# Patient Record
Sex: MALE
[De-identification: names, ages, dates, MRNs, and addresses within clinical notes are randomized per-mention and may not be internally consistent; named-entity substitution may affect disease eponyms.]

---

## 2020-01-30 ENCOUNTER — NURSE TRIAGE (OUTPATIENT)
Dept: OTHER | Facility: CLINIC | Age: 30
End: 2020-01-30

## 2020-12-20 ENCOUNTER — NURSE TRIAGE (OUTPATIENT)
Dept: OTHER | Facility: CLINIC | Age: 30
End: 2020-12-20

## 2020-12-20 NOTE — TELEPHONE ENCOUNTER
Patient called pre-service center [facility])  MMO with red flag complaint. Brief description of triage: His dad on Friday mentioned he could have covid symptoms and feeling off on wed but could be minor he called the MD office they advised they would call back if he should get a covid test and they are waiting on that he is a  and was going to go to work on Monday he wants to know where he should go to get tested prior to Monday and wanted advise     Triage indicates for patient to maintain home care or go to urgent care if needed    Care advice provided, patient verbalizes understanding; denies any other questions or concerns; instructed to call back for any new or worsening symptoms. Attention Provider: Thank you for allowing me to participate in the care of your patient. The patient was connected to triage in response to information provided to the ECC. Please do not respond through this encounter as the response is not directed to a shared pool. Reason for Disposition   [1] CLOSE CONTACT COVID-19 EXPOSURE within last 14 days AND [2] NO symptoms    Answer Assessment - Initial Assessment Questions  1. COVID-19 CLOSE CONTACT: \"Who is the person with the confirmed or suspected COVID-19 infection that you were exposed to? \"      Na    2. PLACE of CONTACT: \"Where were you when you were exposed to COVID-19? \" (e.g., home, school, medical waiting room; which city?)      Home     3. TYPE of CONTACT: \"How much contact was there? \" (e.g., sitting next to, live in same house, work in same office, same building)      Dad is symptomatic     4. DURATION of CONTACT: \"How long were you in contact with the COVID-19 patient? \" (e.g., a few seconds, passed by person, a few minutes, 15 minutes or longer, live with the patient)      Daily     5. MASK: \"Were you wearing a mask? \" \"Was the other person wearing a mask? \" Note: wearing a mask reduces the risk of an   otherwise close contact. No     6.  DATE of CONTACT: \"When did you have contact with a COVID-19 patient? \" (e.g., how many days ago)      Live with him so daily     7. COMMUNITY SPREAD: \"Are there lots of cases of COVID-19 (community spread) where you live? \" (See public health department website, if unsure)        Yes    8. SYMPTOMS: \"Do you have any symptoms? \" (e.g., fever, cough, breathing difficulty, loss of taste or smell)      No symptoms     9. PREGNANCY OR POSTPARTUM: \"Is there any chance you are pregnant? \" \"When was your last menstrual period? \" \"Did you deliver in the last 2 weeks? \"      Na    10. HIGH RISK: \"Do you have any heart or lung problems? Do you have a weak immune system? \" (e.g., heart failure, COPD, asthma, HIV positive, chemotherapy, renal failure, diabetes mellitus, sickle cell anemia, obesity)        No    11. TRAVEL: \"Have you traveled out of the country recently? \" If so, \"When and where? \"  Also ask about out-of-state travel, since the [facility] has identified some high-risk cities for community spread in the [address] [place] [address] Rd[address]. Note: Travel becomes less relevant if there is widespread community transmission where the patient lives.         na    Protocols used: CORONAVIRUS (COVID-19) EXPOSURE-ADULT-AH

## 2021-05-16 ENCOUNTER — NURSE TRIAGE (OUTPATIENT)
Dept: OTHER | Facility: CLINIC | Age: 31
End: 2021-05-16

## 2021-05-16 NOTE — TELEPHONE ENCOUNTER
Reason for Disposition   COVID-19 Home Isolation, questions about    Answer Assessment - Initial Assessment Questions  1. COVID-19 DIAGNOSIS: \"Who made your Coronavirus (COVID-19) diagnosis? \" \"Was it confirmed by a positive lab test?\" If not diagnosed by a HCP, ask \"Are there lots of cases (community spread) where you live? \" (See public health department website, if unsure)      Tested within past 2 weeks ago and negative    2. COVID-19 EXPOSURE: \"Was there any known exposure to COVID before the symptoms began? \" CDC Definition of close contact: within 6 feet (2 meters) for a total of 15 minutes or more over a 24-hour period. Sister also tested negative    3. ONSET: \"When did the COVID-19 symptoms start? \"       2 weeks ago    4. WORST SYMPTOM: \"What is your worst symptom? \" (e.g., cough, fever, shortness of breath, muscle aches)      Stomach ache that has resolved    5. COUGH: \"Do you have a cough? \" If so, ask: \"How bad is the cough? \"        None    6. FEVER: \"Do you have a fever? \" If so, ask: \"What is your temperature, how was it measured, and when did it start? \"      None    7. RESPIRATORY STATUS: \"Describe your breathing? \" (e.g., shortness of breath, wheezing, unable to speak)       NA    8. BETTER-SAME-WORSE: Emeka Ramirez you getting better, staying the same or getting worse compared to yesterday? \"  If getting worse, ask, \"In what way? \"      Better    9. HIGH RISK DISEASE: \"Do you have any chronic medical problems? \" (e.g., asthma, heart or lung disease, weak immune system, obesity, etc.)      NA    10. PREGNANCY: \"Is there any chance you are pregnant? \" \"When was your last menstrual period? \"        NA    11. OTHER SYMPTOMS: \"Do you have any other symptoms? \"  (e.g., chills, fatigue, headache, loss of smell or taste, muscle pain, sore throat; new loss of smell or taste especially support the diagnosis of COVID-19)        None    Protocols used: CORONAVIRUS (COVID-19) DIAGNOSED OR SUSPECTED-ADULT-AH

## 2021-12-12 ENCOUNTER — NURSE TRIAGE (OUTPATIENT)
Dept: OTHER | Facility: CLINIC | Age: 31
End: 2021-12-12

## 2021-12-12 NOTE — TELEPHONE ENCOUNTER
Brief description of triage: Finished Covid quarantine, and wants to know, is it common to get upset stomachs after recovering from Covid. He noticed that his coffee made his stomach upset  The first day, but now it is back to normal.  He noticed it again after eating spicy wings. He wanted to know could he be reinfected within 90 days of recovering from Covid. Triage indicates for patient to [address] advice provided, patient verbalizes understanding; denies any other questions or concerns; instructed to call back for any new or worsening symptoms.    -This triage is a result of a call to [address] [place] [address]. Please do not respond to the triage nurse through this encounter. Any subsequent communication should be directly with the patient. Reason for Disposition   General information question, no triage required and triager able to answer question    Answer Assessment - Initial Assessment Questions  1. REASON FOR CALL or QUESTION: \"What is your reason for calling today? \" or \"How can I best help you? \" or \"What question do you have that I can help answer? \"       He wanted to know could he be reinfected within 90 days of recovering from Covid? Is it normal for your stomach to get upset when staring old routines like drinking coffee in the morning or eating spicy or oily foods for the first time after Covid recovery. Staff informed him that this is normal since his diet may have changed drastically when he was sick with the virus.     Protocols used: INFORMATION ONLY CALL - NO TRIAGE-ADULT-

## 2022-01-16 ENCOUNTER — NURSE TRIAGE (OUTPATIENT)
Dept: OTHER | Facility: CLINIC | Age: 32
End: 2022-01-16

## 2022-01-16 NOTE — TELEPHONE ENCOUNTER
Subjective: Caller states \"I'm still having headaches since having COVID 2 months ago and only when I drink a little extra coffee. I'm worried I might have COVID again. \"     Current Symptoms: See above. \"discomfort\" above and behind ears, R>L, only after coffee. Lasts about 8 hours and resolves spontaneously. Pt also had many questions re: antibody levels after infection, possibility of re-infection (not vaccinated-- this writer recommended vaccine) and post-covid conditions. Read to pt from cdc.gov website re:garding previous topics, questions answered. Onset: 2 months ago; gradual    Associated Symptoms: NA    Pain Severity: 1/10 today, 3/10 max; pressure; intermittent    Temperature: denies fever. What has been tried: States that discomfort hasn't been bad enough to take meds    LMP: NA Pregnant: No    Recommended disposition: see PCP within 2 weeks. Pt agrees. Care advice provided, patient verbalizes understanding; denies any other questions or concerns; instructed to call back for any new or worsening symptoms. Patient/caller to follow-up with PCP     This triage is a result of a call to [address] [place] [address]. Please do not respond to the triage nurse through this encounter. Any subsequent communication should be directly with the patient.         Reason for Disposition   Headache is a chronic symptom (recurrent or ongoing AND present > 4 weeks)    Protocols used: HEADACHE-ADULT-

## 2022-02-25 ENCOUNTER — NURSE TRIAGE (OUTPATIENT)
Dept: OTHER | Facility: CLINIC | Age: 32
End: 2022-02-25

## 2022-02-26 NOTE — TELEPHONE ENCOUNTER
Subjective: Caller states \"I had slight headache earlier today. It went away and I am worried about COVID. \"      Current Symptoms: pt is asymptomatic     Onset: 1 days ago; sudden    Associated Symptoms: NA    Pain Severity: Denies     Temperature:Denies     What has been tried: N/A     LMP: NA Pregnant: NA    Recommended disposition: Home Care    Care advice provided, patient verbalizes understanding; denies any other questions or concerns; instructed to call back for any new or worsening symptoms. Home Care- Questions about COVID only     This triage is a result of a call to [facility]. Please do not respond to the triage nurse through this encounter. Any subsequent communication should be directly with the patient.       Reason for Disposition   COVID-19 Prevention and Healthy Living, questions about    Protocols used: CORONAVIRUS (COVID-19) EXPOSURE-ADULT-AH

## 2022-03-16 ENCOUNTER — NURSE TRIAGE (OUTPATIENT)
Dept: OTHER | Facility: CLINIC | Age: 32
End: 2022-03-16

## 2022-03-16 NOTE — TELEPHONE ENCOUNTER
Subjective: Caller states \"recent abd pain after abx use\"     Current Symptoms:   Mild abd pain - sharp sort of cramping     Onset:  3 days     Associated Symptoms: clindamycin recently     Pain Severity: mild not all the time    Temperature: Denies     What has been tried: none    LMP: NA Pregnant: NA    Recommended disposition: Kathia Adrian [phone] advice provided, patient verbalizes understanding; denies any other questions or concerns; instructed to call back for any new or worsening symptoms. home care with care advice    This triage is a result of a call to [address] [place] [address]. Please do not respond to the triage nurse through this encounter. Any subsequent communication should be directly with the patient.       Reason for Disposition   [1] MILD-MODERATE pain AND [2] comes and goes (cramps)    Protocols used: ABDOMINAL PAIN - MALE-ADULT-

## 2022-03-20 ENCOUNTER — NURSE TRIAGE (OUTPATIENT)
Dept: OTHER | Facility: CLINIC | Age: 32
End: 2022-03-20

## 2022-03-20 NOTE — TELEPHONE ENCOUNTER
Subjective: Caller states \"exposure\"     Current Symptoms:   Has anxiety, trying to \"not be paranoid\"  Had Covid in October  Met with a friend today with lunch who had Covid in December  Friend was getting over a head cold\" that had started a week and a half ago  Congestion and hoarse sounding  Patient wondering if he should consider this an exposure to Covid and quarantine    Advised patient per CDC. Reinfection risk low within 6 months of having Covid  Unlikely that his friend had Covid, friend was likely at the end of his isolation that he would have had to complete if it was Covid  Advised patient that writer would not consider this a Covid exposure  Informed he could test in 5 days if he wanted to be sure    Denies any other questions or concerns; instructed to call back for any new or worsening symptoms. This triage is a result of a call to [address] [place] [address]. Please do not respond to the triage nurse through this encounter. Any subsequent communication should be directly with the patient.     Reason for Disposition   Health Information question, no triage required and triager able to answer question    Protocols used: INFORMATION ONLY CALL - NO TRIAGE-ADULT-

## 2022-04-25 ENCOUNTER — NURSE TRIAGE (OUTPATIENT)
Dept: OTHER | Facility: CLINIC | Age: 32
End: 2022-04-25

## 2022-04-26 NOTE — TELEPHONE ENCOUNTER
Caller with general question about having a runny nose but negative covid test. Says he is a very anxious person, babbled a lot but in the end caller just wanted to know if it could be likely his runny nose was not covid since he had no exposure and test was negative. Advised likely not covid but if he is unsure and develops other symptoms, retest as he didn't want to quarantine unless medically necessary, which it's not.   Reason for Disposition   General information question, no triage required and triager able to answer question    Protocols used: INFORMATION ONLY CALL - NO TRIAGE-ADULT-

## 2022-09-02 ENCOUNTER — NURSE TRIAGE (OUTPATIENT)
Dept: OTHER | Facility: CLINIC | Age: 32
End: 2022-09-02

## 2022-09-02 NOTE — TELEPHONE ENCOUNTER
Subjective: Caller states \"covid questions\"     Current Symptoms:   + covid, questions regarding quarantine     Onset:   11 days ago    Pain Severity:   N/a     Temperature:    N/a    What has been tried:   N/a    Recommended disposition: Home care     Care advice provided, patient verbalizes understanding; denies any other questions or concerns; instructed to call back for any new or worsening symptoms. This triage is a result of a call to [address] [place] [address]. Please do not respond to the triage nurse through this encounter. Any subsequent communication should be directly with the patient.     Reason for Disposition   COVID-19 Home Isolation, questions about    Protocols used: Coronavirus (COVID-19) Diagnosed or Suspected-ADULT-

## 2022-09-03 ENCOUNTER — NURSE TRIAGE (OUTPATIENT)
Dept: OTHER | Facility: CLINIC | Age: 32
End: 2022-09-03

## 2022-09-03 NOTE — TELEPHONE ENCOUNTER
Subjective: Caller states \"I struggle with anxiety\"     Current Symptoms: Concerned about COVID. Has general questions about COVID exposure and testing  Wants to know if should assume his dad could possibly have COVID, even though he is asymptomatic. Caller and mom had it recently. Wanting to know if he should quarantine or others should be careful around his dad. Recommended disposition: Information Only    Care advice provided, patient verbalizes understanding; denies any other questions or concerns; instructed to call back for any new or worsening symptoms. COVID information on quarantine and testing given to caller. This triage is a result of a call to [address] [place] [address]. Please do not respond to the triage nurse through this encounter. Any subsequent communication should be directly with the patient. Reason for Disposition   Health Information question, no triage required and triager able to answer question    Protocols used:  Information Only Call - No Triage-[facility]

## 2022-09-05 ENCOUNTER — NURSE TRIAGE (OUTPATIENT)
Dept: OTHER | Facility: CLINIC | Age: 32
End: 2022-09-05

## 2022-09-05 NOTE — TELEPHONE ENCOUNTER
Subjective: Caller states \"anxiety issue\"     Current Symptoms: had covid and it is over it, family had all gotten it as well. Just calling for information on quarantine and exposure. No triage performed. This triage is a result of a call to [address] [place] [address]. Please do not respond to the triage nurse through this encounter. Any subsequent communication should be directly with the patient. Reason for Disposition   Information only question and nurse able to answer    Protocols used:  Information Only Call - No Triage-ADULT-[place]

## 2023-01-28 ENCOUNTER — NURSE TRIAGE (OUTPATIENT)
Dept: OTHER | Facility: CLINIC | Age: 33
End: 2023-01-28

## 2023-01-28 NOTE — TELEPHONE ENCOUNTER
Location of patient: [place]    Subjective: Caller states \"stubbed right big toe on stairs\"     Current Symptoms: crack on toenail    Onset: an hour ago  ago; sudden    Associated Symptoms: NA    Pain Severity: 1/10; dull; intermittent    Temperature: denies     What has been tried: denies    LMP: NA Pregnant: NA    Recommended disposition: Home Care    Care advice provided, patient verbalizes understanding; denies any other questions or concerns; instructed to call back for any new or worsening symptoms. Home care, keep area clean and dry call back with any new or worsening symptoms    This triage is a result of a call to [address] [place] [address]. Please do not respond to the triage nurse through this encounter. Any subsequent communication should be directly with the patient. Reason for Disposition   Stubbed or jammed toe    Protocols used:  Toe Injury-ADULT-

## 2023-01-29 ENCOUNTER — NURSE TRIAGE (OUTPATIENT)
Dept: OTHER | Facility: CLINIC | Age: 33
End: 2023-01-29

## 2023-01-29 NOTE — TELEPHONE ENCOUNTER
Location of patient: [place]    Subjective: Caller states \"Toe injury\"     Current Symptoms: \"Yesterday, I rammed my toes on carpeted steps. It has never really hurt. I think it created a fissure crack in between my two toes. The skin under my great toenail is bruised. Is there anything I can do for this? \"    Onset: 1 day ago; worsening    Pain Severity: 0/10    Temperature: Denies    What has been tried: Washing with soap and water    Recommended disposition: Kathia Adrian [phone] advice provided, patient verbalizes understanding; denies any other questions or concerns; instructed to call back for any new or worsening symptoms. This triage is a result of a call to [address] [place] [address]. Please do not respond to the triage nurse through this encounter. Any subsequent communication should be directly with the patient. Reason for Disposition   Stubbed or jammed toe    Protocols used:  Toe Injury-ADULT-

## 2023-07-05 ENCOUNTER — NURSE TRIAGE (OUTPATIENT)
Dept: OTHER | Facility: CLINIC | Age: 33
End: 2023-07-05

## 2023-07-05 NOTE — TELEPHONE ENCOUNTER
Location of patient: [place]      Current Symptoms: Injured nail of great toe of right foot, black spot under nail    States was cleaning under nail with nail clippers    Last tetanus vaccine unknown    Onset: 1 week ago;      Pain Severity: 0/10; Denies - drainage from toe / skin discoloration / red streaking     Recommended disposition: See in Office Today or Tomorrow    Care advice provided, patient verbalizes understanding; denies any other questions or concerns; instructed to call back for any new or worsening symptoms. Patient/caller agrees to follow-up with PCP     This triage is a result of a call to [address] [place] [address]. Please do not respond to the triage nurse through this encounter. Any subsequent communication should be directly with the patient. Reason for Disposition   Last tetanus shot > 5 years ago and DIRTY cut or scrape    Protocols used:  Toe Injury-ADULT-[place]